# Patient Record
Sex: FEMALE | Race: BLACK OR AFRICAN AMERICAN | NOT HISPANIC OR LATINO
[De-identification: names, ages, dates, MRNs, and addresses within clinical notes are randomized per-mention and may not be internally consistent; named-entity substitution may affect disease eponyms.]

---

## 2024-09-18 ENCOUNTER — LABORATORY RESULT (OUTPATIENT)
Age: 69
End: 2024-09-18

## 2024-09-18 PROBLEM — Z00.00 ENCOUNTER FOR PREVENTIVE HEALTH EXAMINATION: Status: ACTIVE | Noted: 2024-09-18

## 2024-09-19 ENCOUNTER — RESULT REVIEW (OUTPATIENT)
Age: 69
End: 2024-09-19

## 2024-09-19 ENCOUNTER — OUTPATIENT (OUTPATIENT)
Dept: OUTPATIENT SERVICES | Facility: HOSPITAL | Age: 69
LOS: 1 days | End: 2024-09-19
Payer: COMMERCIAL

## 2024-09-19 ENCOUNTER — APPOINTMENT (OUTPATIENT)
Dept: ORTHOPEDIC SURGERY | Facility: CLINIC | Age: 69
End: 2024-09-19
Payer: COMMERCIAL

## 2024-09-19 VITALS
BODY MASS INDEX: 25.61 KG/M2 | HEIGHT: 64 IN | SYSTOLIC BLOOD PRESSURE: 134 MMHG | OXYGEN SATURATION: 97 % | WEIGHT: 150 LBS | HEART RATE: 69 BPM | DIASTOLIC BLOOD PRESSURE: 80 MMHG

## 2024-09-19 DIAGNOSIS — I63.9 CEREBRAL INFARCTION, UNSPECIFIED: ICD-10-CM

## 2024-09-19 DIAGNOSIS — M17.11 UNILATERAL PRIMARY OSTEOARTHRITIS, RIGHT KNEE: ICD-10-CM

## 2024-09-19 PROCEDURE — 99204 OFFICE O/P NEW MOD 45 MIN: CPT | Mod: 25

## 2024-09-19 PROCEDURE — 73564 X-RAY EXAM KNEE 4 OR MORE: CPT

## 2024-09-19 PROCEDURE — 73564 X-RAY EXAM KNEE 4 OR MORE: CPT | Mod: 26,50

## 2024-09-19 PROCEDURE — 20611 DRAIN/INJ JOINT/BURSA W/US: CPT | Mod: RT

## 2024-09-19 PROCEDURE — 73564 X-RAY EXAM KNEE 4 OR MORE: CPT | Mod: 50

## 2024-09-20 LAB
B PERT IGG+IGM PNL SER: CLEAR
COLOR FLD: YELLOW
EOSINOPHIL # FLD MANUAL: 0 %
FLUID INTAKE SUBSTANCE CLASS: NORMAL
LYMPHOCYTES # FLD MANUAL: 31 %
MESOTHL CELL NFR FLD: 0 %
MONOS+MACROS NFR FLD MANUAL: 66 %
NEUTS SEG # FLD MANUAL: 3 %
NRBC # FLD: 0 %
RBC # FLD MANUAL: 2000 /UL
SYCRY CLARITY: CLEAR
SYCRY COLOR: YELLOW
SYCRY ID: ABNORMAL
SYCRY TUBE: NORMAL
TOTAL CELLS COUNTED FLD: 305 /UL
TUBE TYPE: NORMAL
UNIDENT CELLS NFR FLD MANUAL: 0 %
VARIANT LYMPHS # FLD MANUAL: 0 %

## 2024-09-20 NOTE — DISCUSSION/SUMMARY

## 2024-09-20 NOTE — PROCEDURE
[de-identified] : Ultrasound guided intra-articular steroid injection of right knee:  Following a discussion of the risks (bleeding, infection) and benefits, verbal consent was obtained. Patient placed in supine position. The suprapatellar recess and surrounding structures (patella, femur, quadriceps tendon, suprapatellar fat pad and prefemoral fat pad) were visualized in SAX and LAX with Sonosite 15 Hz linear transducer.   Superolateral knee was anaesthetised with ethyl chloride spray. Under strict sterile technique the right knee was prepped with chlorhexadine. Using ultrasound guidance (superolateral approach) a 20 G 1.5 inch needle was inserted into the suprapatellar recess and after aspiration of 7 mL of clear, serous fluid, 4mL 0.5% Bupivacaine and 2mL 1% lidocaine was injected intra-articularly.    The use of direct ultrasound visualization was necessary to increase patient safety by identifying and avoiding inadvertent needle placement within the neurovascular and osteochondral structures. Additionally, the increased accuracy of needle placement may improve therapeutic efficacy and allow higher diagnostic specificity when evaluating the effectiveness of this injection.  The patient tolerated the procedure well. Post-injection instructions given (no strenuous activity for 48 hours, ice, elevate). Patient verbalized understanding.

## 2024-09-20 NOTE — PROCEDURE
[de-identified] : Ultrasound guided intra-articular steroid injection of right knee:  Following a discussion of the risks (bleeding, infection) and benefits, verbal consent was obtained. Patient placed in supine position. The suprapatellar recess and surrounding structures (patella, femur, quadriceps tendon, suprapatellar fat pad and prefemoral fat pad) were visualized in SAX and LAX with Sonosite 15 Hz linear transducer.   Superolateral knee was anaesthetised with ethyl chloride spray. Under strict sterile technique the right knee was prepped with chlorhexadine. Using ultrasound guidance (superolateral approach) a 20 G 1.5 inch needle was inserted into the suprapatellar recess and after aspiration of 7 mL of clear, serous fluid, 4mL 0.5% Bupivacaine and 2mL 1% lidocaine was injected intra-articularly.    The use of direct ultrasound visualization was necessary to increase patient safety by identifying and avoiding inadvertent needle placement within the neurovascular and osteochondral structures. Additionally, the increased accuracy of needle placement may improve therapeutic efficacy and allow higher diagnostic specificity when evaluating the effectiveness of this injection.  The patient tolerated the procedure well. Post-injection instructions given (no strenuous activity for 48 hours, ice, elevate). Patient verbalized understanding.

## 2024-09-20 NOTE — HISTORY OF PRESENT ILLNESS
[de-identified] : KRISHNA LUGO is a 68-year-old female presents with right knee pain. Associated swelling. No recent antecedent trauma. She tried PT, ice previously. NSAIDs contraindicated due to CVA. Currently taking aspirin. She fell on her knees many years ago.  She had CSI at [place] 2 months ago which did not help.

## 2024-09-20 NOTE — PHYSICAL EXAM
[de-identified] : General: Well-nourished, well-developed, alert, and in no acute distress. Head: Normocephalic. Eyes: Pupils equal, extraocular muscles intact, normal sclera. Nose: No nasal discharge. Cardiovascular: Extremities are warm and well perfused. Distal pulses are symmetric bilaterally. Respiratory: No labored breathing. Extremities: Sensation is intact distally bilaterally. Distal pulses are symmetric bilaterally Lymphatic: No regional lymphadenopathy, no lymphedema Neurologic: No focal deficits Skin: Normal skin color, texture, and turgor Psychiatric: Normal affect  MSK: Examination of [right] knee:   Gait [normal] Alignment: genu varum Effusion: moderate No erythema, hematoma or skin lesion Tender to palpation:  medial joint line Nontender to palpation: lateral joint line, medial patellar facet, lateral patellar facet, quad tendon, patellar tendon, pes, Gerdy's tubercle, tibial tuberosity, popliteal fossa, hamstrings, ITB No warmth No Baker's cyst palpable ROM: 0-100 Mild patellar crepitus   Log roll negative Lachman negative Anterior drawer negative Posterior drawer negative Varus/valgus stress negative at 0 and 30 deg Andreina negative Extensor mechanism intact     Examination of [left] knee:   No effusion, erythema, hematoma or skin lesion Nontender to palpation: medial joint line, lateral joint line, medial patellar facet, lateral patellar facet, quad tendon, patellar tendon, pes, Gerdy's tubercle, tibial tuberosity, popliteal fossa, hamstrings, ITB No warmth No Baker's cyst palpable ROM: 0-110 [No] patellar crepitus   Log roll negative Lachman negative Anterior drawer negative Posterior drawer negative Varus/valgus stress negative at 0 and 30 deg Andreina negative Extensor mechanism intact   Sensation is intact to light touch over the superficial and deep peroneal nerve distributions and the posterior tibial nerve distribution. Capillary refill is less than two seconds. Posterior tibial and dorsalis pedis pulses 2+ equal bilaterally. No calf swelling or tenderness bilaterally. Strength testing shows hip flexion 5/5, hip adduction 5/5, hip abduction 5/5, knee extension 5/5, knee flexion 5/5, dorsiflexion 5/5, plantar flexion 5/5, EHL 5/5 Reflexes: Patellar 2+, Achilles 2+ [de-identified] : Date: 09/19/2024 Location: [facility] Body part: XRAY B/L KNEE independently reviewed and interpreted by me. Impression: There is no evidence of fracture or dislocation.  Mild medial and lateral joint space narrowing with subchondral sclerosis R>L.

## 2024-09-20 NOTE — ASSESSMENT
[FreeTextEntry1] : KRISHNA LUGO is a 68 year old female with right knee pain. I discussed with the patient that their symptoms, signs, and imaging are most consistent with osteoarthritis. We reviewed the natural history of this condition and treatment options. We agreed on the following plan:   XR taken and reviewed with patient today. US guided aspiration as detailed above. Synovial fluid sent for lab analysis. Cortisone injection deferred as patient had flu vaccine today. Activity modification: low impact aerobic activity (stationary bike, elliptical, swimming) Start Home Exercises for knee conditioning. Demonstration and handout provided. Physical therapy. Referral provided. Medication: Acetaminophen 1 g 3 times daily as needed and diclofenac gel 3 times daily as needed. Consider referral to rheumatology to evaluate for possible RA. Follow up in 6-8 weeks.

## 2024-09-20 NOTE — DISCUSSION/SUMMARY

## 2024-09-20 NOTE — PHYSICAL EXAM
[de-identified] : General: Well-nourished, well-developed, alert, and in no acute distress. Head: Normocephalic. Eyes: Pupils equal, extraocular muscles intact, normal sclera. Nose: No nasal discharge. Cardiovascular: Extremities are warm and well perfused. Distal pulses are symmetric bilaterally. Respiratory: No labored breathing. Extremities: Sensation is intact distally bilaterally. Distal pulses are symmetric bilaterally Lymphatic: No regional lymphadenopathy, no lymphedema Neurologic: No focal deficits Skin: Normal skin color, texture, and turgor Psychiatric: Normal affect  MSK: Examination of [right] knee:   Gait [normal] Alignment: genu varum Effusion: moderate No erythema, hematoma or skin lesion Tender to palpation:  medial joint line Nontender to palpation: lateral joint line, medial patellar facet, lateral patellar facet, quad tendon, patellar tendon, pes, Gerdy's tubercle, tibial tuberosity, popliteal fossa, hamstrings, ITB No warmth No Baker's cyst palpable ROM: 0-100 Mild patellar crepitus   Log roll negative Lachman negative Anterior drawer negative Posterior drawer negative Varus/valgus stress negative at 0 and 30 deg Andreina negative Extensor mechanism intact     Examination of [left] knee:   No effusion, erythema, hematoma or skin lesion Nontender to palpation: medial joint line, lateral joint line, medial patellar facet, lateral patellar facet, quad tendon, patellar tendon, pes, Gerdy's tubercle, tibial tuberosity, popliteal fossa, hamstrings, ITB No warmth No Baker's cyst palpable ROM: 0-110 [No] patellar crepitus   Log roll negative Lachman negative Anterior drawer negative Posterior drawer negative Varus/valgus stress negative at 0 and 30 deg Andreina negative Extensor mechanism intact   Sensation is intact to light touch over the superficial and deep peroneal nerve distributions and the posterior tibial nerve distribution. Capillary refill is less than two seconds. Posterior tibial and dorsalis pedis pulses 2+ equal bilaterally. No calf swelling or tenderness bilaterally. Strength testing shows hip flexion 5/5, hip adduction 5/5, hip abduction 5/5, knee extension 5/5, knee flexion 5/5, dorsiflexion 5/5, plantar flexion 5/5, EHL 5/5 Reflexes: Patellar 2+, Achilles 2+ [de-identified] : Date: 09/19/2024 Location: [facility] Body part: XRAY B/L KNEE independently reviewed and interpreted by me. Impression: There is no evidence of fracture or dislocation.  Mild medial and lateral joint space narrowing with subchondral sclerosis R>L.

## 2024-09-20 NOTE — HISTORY OF PRESENT ILLNESS
[de-identified] : KRISHNA LUGO is a 68-year-old female presents with right knee pain. Associated swelling. No recent antecedent trauma. She tried PT, ice previously. NSAIDs contraindicated due to CVA. Currently taking aspirin. She fell on her knees many years ago.  She had CSI at [place] 2 months ago which did not help.